# Patient Record
Sex: FEMALE | Race: WHITE | NOT HISPANIC OR LATINO
[De-identification: names, ages, dates, MRNs, and addresses within clinical notes are randomized per-mention and may not be internally consistent; named-entity substitution may affect disease eponyms.]

---

## 2018-07-02 ENCOUNTER — TRANSCRIPTION ENCOUNTER (OUTPATIENT)
Age: 28
End: 2018-07-02

## 2019-02-10 ENCOUNTER — TRANSCRIPTION ENCOUNTER (OUTPATIENT)
Age: 29
End: 2019-02-10

## 2019-10-28 ENCOUNTER — TRANSCRIPTION ENCOUNTER (OUTPATIENT)
Age: 29
End: 2019-10-28

## 2024-05-13 DIAGNOSIS — J06.9 ACUTE UPPER RESPIRATORY INFECTION, UNSPECIFIED: ICD-10-CM

## 2024-05-13 RX ORDER — AMOXICILLIN AND CLAVULANATE POTASSIUM 875; 125 MG/1; MG/1
875-125 TABLET, COATED ORAL TWICE DAILY
Qty: 20 | Refills: 0 | Status: ACTIVE | COMMUNITY
Start: 2024-05-13 | End: 1900-01-01

## 2024-09-24 DIAGNOSIS — R11.0 NAUSEA: ICD-10-CM

## 2024-09-24 RX ORDER — ONDANSETRON 4 MG/1
4 TABLET, ORALLY DISINTEGRATING ORAL
Qty: 30 | Refills: 0 | Status: ACTIVE | COMMUNITY
Start: 2024-09-24 | End: 1900-01-01

## 2025-06-16 ENCOUNTER — EMERGENCY (EMERGENCY)
Facility: HOSPITAL | Age: 35
LOS: 0 days | Discharge: HOME | End: 2025-06-16
Attending: STUDENT IN AN ORGANIZED HEALTH CARE EDUCATION/TRAINING PROGRAM
Payer: COMMERCIAL

## 2025-06-16 VITALS
RESPIRATION RATE: 16 BRPM | SYSTOLIC BLOOD PRESSURE: 120 MMHG | OXYGEN SATURATION: 100 % | DIASTOLIC BLOOD PRESSURE: 71 MMHG | HEART RATE: 64 BPM | TEMPERATURE: 98 F

## 2025-06-16 VITALS
SYSTOLIC BLOOD PRESSURE: 167 MMHG | DIASTOLIC BLOOD PRESSURE: 84 MMHG | OXYGEN SATURATION: 100 % | WEIGHT: 169.98 LBS | HEART RATE: 84 BPM | RESPIRATION RATE: 20 BRPM | HEIGHT: 62 IN | TEMPERATURE: 98 F
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
## 2025-06-16 DIAGNOSIS — M54.2 CERVICALGIA: ICD-10-CM

## 2025-06-16 DIAGNOSIS — Z88.2 ALLERGY STATUS TO SULFONAMIDES: ICD-10-CM
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
## 2025-06-16 PROCEDURE — 93005 ELECTROCARDIOGRAM TRACING: CPT

## 2025-06-16 PROCEDURE — 70450 CT HEAD/BRAIN W/O DYE: CPT

## 2025-06-16 PROCEDURE — 99284 EMERGENCY DEPT VISIT MOD MDM: CPT

## 2025-06-16 PROCEDURE — 72125 CT NECK SPINE W/O DYE: CPT

## 2025-06-16 PROCEDURE — 99284 EMERGENCY DEPT VISIT MOD MDM: CPT | Mod: 25

## 2025-06-16 PROCEDURE — 72125 CT NECK SPINE W/O DYE: CPT | Mod: 26

## 2025-06-16 PROCEDURE — 93010 ELECTROCARDIOGRAM REPORT: CPT

## 2025-06-16 PROCEDURE — 70450 CT HEAD/BRAIN W/O DYE: CPT | Mod: 26

## 2025-06-16 RX ORDER — CYCLOBENZAPRINE HYDROCHLORIDE 15 MG/1
1 CAPSULE, EXTENDED RELEASE ORAL
Qty: 6 | Refills: 0
Start: 2025-06-16 | End: 2025-06-17

## 2025-06-16 RX ORDER — LIDOCAINE HYDROCHLORIDE 20 MG/ML
1 JELLY TOPICAL ONCE
Refills: 0 | Status: COMPLETED | OUTPATIENT
Start: 2025-06-16 | End: 2025-06-16

## 2025-06-16 RX ORDER — OXYCODONE HYDROCHLORIDE AND ACETAMINOPHEN 10; 325 MG/1; MG/1
1 TABLET ORAL
Qty: 9 | Refills: 0
Start: 2025-06-16 | End: 2025-06-18

## 2025-06-16 RX ORDER — CYCLOBENZAPRINE HYDROCHLORIDE 15 MG/1
10 CAPSULE, EXTENDED RELEASE ORAL ONCE
Refills: 0 | Status: COMPLETED | OUTPATIENT
Start: 2025-06-16 | End: 2025-06-16

## 2025-06-16 RX ADMIN — LIDOCAINE HYDROCHLORIDE 1 PATCH: 20 JELLY TOPICAL at 17:41

## 2025-06-16 RX ADMIN — CYCLOBENZAPRINE HYDROCHLORIDE 10 MILLIGRAM(S): 15 CAPSULE, EXTENDED RELEASE ORAL at 17:40
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
## 2025-06-16 NOTE — ED PROVIDER NOTE - CONSTITUTIONAL, MLM
Well appearing, awake, alert, oriented to person, place, time/situation and in mild apparent anxious distress. normal...

## 2025-06-16 NOTE — ED PROVIDER NOTE - CARE PROVIDER_API CALL
Dejon Arias  Orthopaedic Surgery  [address] [place] [id number]  Phone: [phone]  Fax: [phone]  Follow Up Time:

## 2025-06-16 NOTE — ED ADULT NURSE REASSESSMENT NOTE - NS ED NURSE REASSESS COMMENT FT1
Pt ambulated to the bathroom, equal and steady gait observed at this time. MD Beltre made aware. Awaiting dc.

## 2025-06-16 NOTE — ED ADULT NURSE REASSESSMENT NOTE - NS ED NURSE REASSESS COMMENT FT1
Pt endorsing anxiety due to c-collar and requesting to take off c-collar. MD Beltre notified and states ok to take c-collar off if patient lies supine. Pt verbalizing improvement of pain at this time and not requesting further medication at this time.
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
## 2025-06-16 NOTE — ED PROVIDER NOTE - ED STEMI HIDDEN
hide Simponi Counseling:  I discussed with the patient the risks of golimumab including but not limited to myelosuppression, immunosuppression, autoimmune hepatitis, demyelinating diseases, lymphoma, and serious infections.  The patient understands that monitoring is required including a PPD at baseline and must alert us or the primary physician if symptoms of infection or other concerning signs are noted.

## 2025-06-16 NOTE — ED PROVIDER NOTE - PATIENT PORTAL LINK FT
You can access the FollowMyHealth Patient Portal offered by [facility] by registering at the following website: [email or web address]. By joining [employer]’s FollowMyHealth portal, you will also be able to view your health information using other applications (apps) compatible with our system.
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
